# Patient Record
Sex: MALE | Race: WHITE | Employment: OTHER | URBAN - METROPOLITAN AREA
[De-identification: names, ages, dates, MRNs, and addresses within clinical notes are randomized per-mention and may not be internally consistent; named-entity substitution may affect disease eponyms.]

---

## 2017-08-02 ENCOUNTER — APPOINTMENT (OUTPATIENT)
Dept: CT IMAGING | Age: 52
End: 2017-08-02
Attending: EMERGENCY MEDICINE
Payer: COMMERCIAL

## 2017-08-02 ENCOUNTER — HOSPITAL ENCOUNTER (EMERGENCY)
Age: 52
Discharge: HOME | End: 2017-08-03
Attending: EMERGENCY MEDICINE | Admitting: EMERGENCY MEDICINE
Payer: COMMERCIAL

## 2017-08-02 DIAGNOSIS — K57.92 ACUTE DIVERTICULITIS: Primary | ICD-10-CM

## 2017-08-02 LAB
ANION GAP BLD CALC-SCNC: 9 MMOL/L (ref 5–15)
BASOPHILS # BLD AUTO: 0 K/UL (ref 0–0.1)
BASOPHILS # BLD: 0 % (ref 0–1)
BUN SERPL-MCNC: 13 MG/DL (ref 6–20)
BUN/CREAT SERPL: 11 (ref 12–20)
CALCIUM SERPL-MCNC: 9 MG/DL (ref 8.5–10.1)
CHLORIDE SERPL-SCNC: 103 MMOL/L (ref 97–108)
CO2 SERPL-SCNC: 29 MMOL/L (ref 21–32)
CREAT SERPL-MCNC: 1.22 MG/DL (ref 0.7–1.3)
EOSINOPHIL # BLD: 0.1 K/UL (ref 0–0.4)
EOSINOPHIL NFR BLD: 1 % (ref 0–7)
ERYTHROCYTE [DISTWIDTH] IN BLOOD BY AUTOMATED COUNT: 14.4 % (ref 11.5–14.5)
GLUCOSE SERPL-MCNC: 93 MG/DL (ref 65–100)
HCT VFR BLD AUTO: 38.2 % (ref 36.6–50.3)
HGB BLD-MCNC: 12.7 G/DL (ref 12.1–17)
LYMPHOCYTES # BLD AUTO: 30 % (ref 12–49)
LYMPHOCYTES # BLD: 3.2 K/UL (ref 0.8–3.5)
MCH RBC QN AUTO: 28.1 PG (ref 26–34)
MCHC RBC AUTO-ENTMCNC: 33.2 G/DL (ref 30–36.5)
MCV RBC AUTO: 84.5 FL (ref 80–99)
MONOCYTES # BLD: 0.9 K/UL (ref 0–1)
MONOCYTES NFR BLD AUTO: 9 % (ref 5–13)
NEUTS SEG # BLD: 6.4 K/UL (ref 1.8–8)
NEUTS SEG NFR BLD AUTO: 60 % (ref 32–75)
PLATELET # BLD AUTO: 184 K/UL (ref 150–400)
POTASSIUM SERPL-SCNC: 3.4 MMOL/L (ref 3.5–5.1)
RBC # BLD AUTO: 4.52 M/UL (ref 4.1–5.7)
SODIUM SERPL-SCNC: 141 MMOL/L (ref 136–145)
WBC # BLD AUTO: 10.5 K/UL (ref 4.1–11.1)

## 2017-08-02 PROCEDURE — 96375 TX/PRO/DX INJ NEW DRUG ADDON: CPT

## 2017-08-02 PROCEDURE — 74011250636 HC RX REV CODE- 250/636: Performed by: EMERGENCY MEDICINE

## 2017-08-02 PROCEDURE — 36415 COLL VENOUS BLD VENIPUNCTURE: CPT | Performed by: EMERGENCY MEDICINE

## 2017-08-02 PROCEDURE — 96365 THER/PROPH/DIAG IV INF INIT: CPT

## 2017-08-02 PROCEDURE — 74176 CT ABD & PELVIS W/O CONTRAST: CPT

## 2017-08-02 PROCEDURE — 96361 HYDRATE IV INFUSION ADD-ON: CPT

## 2017-08-02 PROCEDURE — 85025 COMPLETE CBC W/AUTO DIFF WBC: CPT | Performed by: EMERGENCY MEDICINE

## 2017-08-02 PROCEDURE — 80048 BASIC METABOLIC PNL TOTAL CA: CPT | Performed by: EMERGENCY MEDICINE

## 2017-08-02 PROCEDURE — 99283 EMERGENCY DEPT VISIT LOW MDM: CPT

## 2017-08-02 PROCEDURE — 96372 THER/PROPH/DIAG INJ SC/IM: CPT

## 2017-08-02 PROCEDURE — 74011250637 HC RX REV CODE- 250/637: Performed by: EMERGENCY MEDICINE

## 2017-08-02 PROCEDURE — 74011000258 HC RX REV CODE- 258: Performed by: EMERGENCY MEDICINE

## 2017-08-02 RX ORDER — KETOROLAC TROMETHAMINE 30 MG/ML
15 INJECTION, SOLUTION INTRAMUSCULAR; INTRAVENOUS
Status: COMPLETED | OUTPATIENT
Start: 2017-08-02 | End: 2017-08-02

## 2017-08-02 RX ORDER — ACETAMINOPHEN 325 MG/1
975 TABLET ORAL
Status: COMPLETED | OUTPATIENT
Start: 2017-08-02 | End: 2017-08-02

## 2017-08-02 RX ORDER — DICYCLOMINE HYDROCHLORIDE 10 MG/ML
20 INJECTION INTRAMUSCULAR
Status: COMPLETED | OUTPATIENT
Start: 2017-08-02 | End: 2017-08-02

## 2017-08-02 RX ORDER — METRONIDAZOLE 500 MG/1
500 TABLET ORAL 3 TIMES DAILY
Qty: 30 TAB | Refills: 0 | Status: SHIPPED | OUTPATIENT
Start: 2017-08-02 | End: 2017-08-12

## 2017-08-02 RX ORDER — CIPROFLOXACIN 750 MG/1
750 TABLET, FILM COATED ORAL 2 TIMES DAILY
Qty: 20 TAB | Refills: 0 | Status: SHIPPED | OUTPATIENT
Start: 2017-08-02

## 2017-08-02 RX ADMIN — KETOROLAC TROMETHAMINE 15 MG: 30 INJECTION, SOLUTION INTRAMUSCULAR at 23:16

## 2017-08-02 RX ADMIN — SODIUM CHLORIDE 1000 ML: 900 INJECTION, SOLUTION INTRAVENOUS at 23:31

## 2017-08-02 RX ADMIN — ACETAMINOPHEN 975 MG: 325 TABLET, FILM COATED ORAL at 23:16

## 2017-08-02 RX ADMIN — DICYCLOMINE HYDROCHLORIDE 20 MG: 20 INJECTION, SOLUTION INTRAMUSCULAR at 22:18

## 2017-08-02 RX ADMIN — SODIUM CHLORIDE 1000 ML: 900 INJECTION, SOLUTION INTRAVENOUS at 22:15

## 2017-08-02 RX ADMIN — PIPERACILLIN SODIUM,TAZOBACTAM SODIUM 3.38 G: 3; .375 INJECTION, POWDER, FOR SOLUTION INTRAVENOUS at 23:16

## 2017-08-03 VITALS
OXYGEN SATURATION: 100 % | HEIGHT: 69 IN | RESPIRATION RATE: 18 BRPM | DIASTOLIC BLOOD PRESSURE: 86 MMHG | HEART RATE: 86 BPM | WEIGHT: 195 LBS | BODY MASS INDEX: 28.88 KG/M2 | SYSTOLIC BLOOD PRESSURE: 122 MMHG | TEMPERATURE: 98.3 F

## 2017-08-03 LAB
APPEARANCE UR: CLEAR
BACTERIA URNS QL MICRO: NEGATIVE /HPF
BILIRUB UR QL: NEGATIVE
COLOR UR: ABNORMAL
EPITH CASTS URNS QL MICRO: ABNORMAL /LPF
GLUCOSE UR STRIP.AUTO-MCNC: NEGATIVE MG/DL
HGB UR QL STRIP: ABNORMAL
HYALINE CASTS URNS QL MICRO: ABNORMAL /LPF (ref 0–5)
KETONES UR QL STRIP.AUTO: NEGATIVE MG/DL
LEUKOCYTE ESTERASE UR QL STRIP.AUTO: NEGATIVE
NITRITE UR QL STRIP.AUTO: NEGATIVE
PH UR STRIP: 6 [PH] (ref 5–8)
PROT UR STRIP-MCNC: ABNORMAL MG/DL
RBC #/AREA URNS HPF: ABNORMAL /HPF (ref 0–5)
SP GR UR REFRACTOMETRY: 1.02 (ref 1–1.03)
UROBILINOGEN UR QL STRIP.AUTO: 0.2 EU/DL (ref 0.2–1)
WBC URNS QL MICRO: ABNORMAL /HPF (ref 0–4)

## 2017-08-03 PROCEDURE — 81001 URINALYSIS AUTO W/SCOPE: CPT | Performed by: EMERGENCY MEDICINE

## 2017-08-03 NOTE — ED PROVIDER NOTES
HPI Comments: 47 y.o. male with past medical history significant for diverticulitis, testicular cancer, and asthma who presents from home with chief complaint of abdominal pain. Patient complains of sudden onset of severe lower abdominal pain that radiates to his right flank and right lower back approximately 2 hours ago. Patient states that he was cooking dinner during onset of pain. He complains of difficulty urinating and decreased urinary output. He states that his urine \"feels thick\". Patient's last bowel movement was 1 hour ago. He reports that his current pain does not feel similar to the pain he experienced with his diverticulitis. He denies having any vomiting, diaphoresis, or dysuria. There are no other acute medical concerns at this time. Social hx: former smoker, occasional EtOH use, no drug use  PCP: Ortega Olivier MD  Review of Records: Patient was admitted in November 2013 (4 years ago) for sigmoid colon diverticulitis. He received a CT scan in October 2013 which was unremarkable for kidney stones. Patient has had testicular cancer twice (last occurrence 10 years ago) and received a bilateral orchiectomy. He states that his cancer was non metastatic and denies receiving chemotherapy. Note written by Nico Botello, as dictated by Eitan Duran, DO 9:40 PM    The history is provided by the patient. No  was used. Past Medical History:   Diagnosis Date    Asthma     Cancer [facility])     testicular    Cancer of testicle ([facility] [address]     Diverticulitis        Past Surgical History:   Procedure Laterality Date    ABDOMEN SURGERY PROC UNLISTED  1991    abd lymph nodes removed    HX HEENT      HX UROLOGICAL  2006    Bilat Orchiectomy    NEUROLOGICAL PROCEDURE UNLISTED      fatty tumor on spine         History reviewed. No pertinent family history.     Social History     Social History    Marital status:      Spouse name: N/A    Number of children: N/A    Years of education: N/A     Occupational History    Not on file. Social History Main Topics    Smoking status: Current Some Day Smoker     Packs/day: 0.00    Smokeless tobacco: Not on file      Comment: occasional cigarette    Alcohol use Yes      Comment: occasional    Drug use: Not on file    Sexual activity: Not on file     Other Topics Concern    Not on file     Social History Narrative         ALLERGIES: Contrast agent [iodine]    Review of Systems   Constitutional: Negative for diaphoresis. Gastrointestinal: Positive for abdominal pain. Negative for diarrhea, nausea and vomiting. Genitourinary: Positive for decreased urine volume, difficulty urinating and flank pain. Negative for dysuria. Musculoskeletal: Positive for back pain. All other systems reviewed and are negative. Vitals:    08/02/17 2115   BP: 132/80   Pulse: 93   Resp: 18   Temp: 98.8 °F (37.1 °C)   SpO2: 100%   Weight: 88.5 kg (195 lb)   Height: 5' 9\" (1.753 m)            Physical Exam      Constitutional: Pt is awake and alert. Pt appears well-developed and well-nourished. Appears to be in severe pain. HENT:   Head: Normocephalic and atraumatic. Nose: Nose normal.   Mouth/Throat: Oropharynx is clear and moist. No oropharyngeal exudate. Eyes: Conjunctivae and extraocular motions are normal. Pupils are equal, round, and reactive to light. Right eye exhibits no discharge. Left eye exhibits no discharge. No scleral icterus. Neck: No tracheal deviation present. Supple neck. Cardiovascular: Normal rate, regular rhythm, normal heart sounds and intact distal pulses. Exam reveals no gallop and no friction rub. No murmur heard. Pulmonary/Chest: Effort normal and breath sounds normal.  Pt  has no wheezes. Pt  has no rales. Abdominal: Soft. Pt  exhibits no distension and no mass. Tenderness of LLQ and suprapubic region. Pt has no rebound and no guarding.    Musculoskeletal:  Pt  exhibits no edema and no tenderness. Ext: Normal ROM in all four extremities; not tender to palpation; distal pulses are normal, no edema. Neurological:  Pt is alert. nonfocal neuro exam.  Skin: Skin is warm and dry. Pt  is not diaphoretic. Psychiatric:  Pt  has a normal mood and affect. Behavior is normal.   Note written by Nico Stark, as dictated by Domi Drake DO 9:40 PM  [facility]  ED Course       Procedures  PROGRESS NOTE:  10:37 PM Reviewed CT results. Reviewed ct images    11:00 pm - better. Still quite tender but soft. Will try toradol and tylenol. Iv zosyn ordered. Will need another abd exam before he leaves. Signed out to Dr Nela Fajardo for this. Recent Results (from the past 12 hour(s))   CBC WITH AUTOMATED DIFF    Collection Time: 08/02/17  9:56 PM   Result Value Ref Range    WBC 10.5 4.1 - 11.1 K/uL    RBC 4.52 4.10 - 5.70 M/uL    HGB 12.7 12.1 - 17.0 g/dL    HCT 38.2 36.6 - 50.3 %    MCV 84.5 80.0 - 99.0 FL    MCH 28.1 26.0 - 34.0 PG    MCHC 33.2 30.0 - 36.5 g/dL    RDW 14.4 11.5 - 14.5 %    PLATELET 845 740 - 744 K/uL    NEUTROPHILS 60 32 - 75 %    LYMPHOCYTES 30 12 - 49 %    MONOCYTES 9 5 - 13 %    EOSINOPHILS 1 0 - 7 %    BASOPHILS 0 0 - 1 %    ABS. NEUTROPHILS 6.4 1.8 - 8.0 K/UL    ABS. LYMPHOCYTES 3.2 0.8 - 3.5 K/UL    ABS. MONOCYTES 0.9 0.0 - 1.0 K/UL    ABS. EOSINOPHILS 0.1 0.0 - 0.4 K/UL    ABS.  BASOPHILS 0.0 0.0 - 0.1 K/UL   METABOLIC PANEL, BASIC    Collection Time: 08/02/17  9:56 PM   Result Value Ref Range    Sodium 141 136 - 145 mmol/L    Potassium 3.4 (L) 3.5 - 5.1 mmol/L    Chloride 103 97 - 108 mmol/L    CO2 29 21 - 32 mmol/L    Anion gap 9 5 - 15 mmol/L    Glucose 93 65 - 100 mg/dL    BUN 13 6 - 20 MG/DL    Creatinine 1.22 0.70 - 1.30 MG/DL    BUN/Creatinine ratio 11 (L) 12 - 20      GFR est AA >60 >60 ml/min/1.73m2    GFR est non-AA >60 >60 ml/min/1.73m2    Calcium 9.0 8.5 - 10.1 MG/DL             Pt, if he goes home, will be given cipro and flagyl  He does not want any rx for pain though. Wants to try heat and otc pain meds.

## 2017-08-03 NOTE — DISCHARGE INSTRUCTIONS
We hope that we have addressed all of your medical concerns. The examination and treatment you received in the Emergency Department were for an emergent problem and were not intended as complete care. It is important that you follow up with your healthcare provider(s) for ongoing care. If your symptoms worsen or do not improve as expected, and you are unable to reach your usual health care provider(s), you should return to the Emergency Department. Today's healthcare is undergoing tremendous change, and patient satisfaction surveys are one of the many tools to assess the quality of medical care. You may receive a survey from the [facility] regarding your experience in the Emergency Department. I hope that your experience has been completely positive, particularly the medical care that I provided. As such, please participate in the survey; anything less than excellent does not meet my expectations or intentions. formerly [facility] and [address] [place] [address] participate in nationally recognized quality of care measures. If your blood pressure is greater than 120/80, as reported below, we urge that you seek medical care to address the potential of high blood pressure, commonly known as hypertension. Hypertension can be hereditary or can be caused by certain medical conditions, pain, stress, or \"white coat syndrome. \"       Please make an appointment with your health care provider(s) for follow up of your Emergency Department visit. VITALS:   Patient Vitals for the past 8 hrs:   Temp Pulse Resp BP SpO2   08/02/17 2115 98.8 °F (37.1 °C) 93 18 132/80 100 %          Thank you for allowing us to provide you with medical care today. We realize that you have many choices for your emergency care needs. Please choose us in the future for any continued health care needs.       Regards,           Adriana Funes, DO    [facility]   Office: [phone]            Recent Results (from the past 24 hour(s))   CBC WITH AUTOMATED DIFF    Collection Time: 08/02/17  9:56 PM   Result Value Ref Range    WBC 10.5 4.1 - 11.1 K/uL    RBC 4.52 4.10 - 5.70 M/uL    HGB 12.7 12.1 - 17.0 g/dL    HCT 38.2 36.6 - 50.3 %    MCV 84.5 80.0 - 99.0 FL    MCH 28.1 26.0 - 34.0 PG    MCHC 33.2 30.0 - 36.5 g/dL    RDW 14.4 11.5 - 14.5 %    PLATELET 803 627 - 594 K/uL    NEUTROPHILS 60 32 - 75 %    LYMPHOCYTES 30 12 - 49 %    MONOCYTES 9 5 - 13 %    EOSINOPHILS 1 0 - 7 %    BASOPHILS 0 0 - 1 %    ABS. NEUTROPHILS 6.4 1.8 - 8.0 K/UL    ABS. LYMPHOCYTES 3.2 0.8 - 3.5 K/UL    ABS. MONOCYTES 0.9 0.0 - 1.0 K/UL    ABS. EOSINOPHILS 0.1 0.0 - 0.4 K/UL    ABS. BASOPHILS 0.0 0.0 - 0.1 K/UL   METABOLIC PANEL, BASIC    Collection Time: 08/02/17  9:56 PM   Result Value Ref Range    Sodium 141 136 - 145 mmol/L    Potassium 3.4 (L) 3.5 - 5.1 mmol/L    Chloride 103 97 - 108 mmol/L    CO2 29 21 - 32 mmol/L    Anion gap 9 5 - 15 mmol/L    Glucose 93 65 - 100 mg/dL    BUN 13 6 - 20 MG/DL    Creatinine 1.22 0.70 - 1.30 MG/DL    BUN/Creatinine ratio 11 (L) 12 - 20      GFR est AA >60 >60 ml/min/1.73m2    GFR est non-AA >60 >60 ml/min/1.73m2    Calcium 9.0 8.5 - 10.1 MG/DL       Ct Abd Pelv Wo Cont    Result Date: 8/2/2017  INDICATION: lower abd pain. acute onset 2 hrs ago. hx of diverticulitis. COMPARISON: October 11, 2013 TECHNIQUE: Noncontrast thin axial images were obtained through the abdomen and pelvis. Coronal and sagittal reconstructions were generated. CT dose reduction was achieved through use of a standardized protocol tailored for this examination and automatic exposure control for dose modulation. Adaptive statistical iterative reconstruction (ASIR) was utilized. FINDINGS: LUNG BASES: No abnormality. LIVER: No mass or biliary dilatation. GALLBLADDER: Unremarkable. SPLEEN: No enlargement or lesion. PANCREAS: No mass or ductal dilatation. ADRENALS: No mass.  KIDNEYS: No mass, calculus, or hydronephrosis. GI TRACT:  Focal inflammatory stranding and concentric wall thickening of the sigmoid colon, associated with diverticulosis. PERITONEUM: No free air or free fluid. APPENDIX: Unremarkable. [place] Ring RETROPERITONEUM: Surgical clips in the left retroperitoneum. No enlarged abdominal lymph nodes. ADDITIONAL COMMENTS: N/A. URINARY BLADDER: Unremarkable. REPRODUCTIVE ORGANS: Unremarkable. LYMPH NODES:  None enlarged. FREE FLUID:  Small amount of free fluid. BONES: No destructive bone lesion. ADDITIONAL COMMENTS: N/A. IMPRESSION: Focal concentric sigmoid wall thickening, associated with diverticulosis and inflammatory stranding. This is most consistent with acute diverticulitis, although focal colitis or less likely neoplasm may present in this fashion. Consider clinical follow-up to resolution and if necessary, follow-up endoscopy after acute inflammation has resolved. Learning About Diverticulosis and Diverticulitis  What are diverticulosis and diverticulitis? In diverticulosis and diverticulitis, pouches called diverticula form in the wall of the large intestine, or colon. · In diverticulosis, the pouches do not cause any pain or other symptoms. · In diverticulitis, the pouches get inflamed or infected and cause symptoms. Doctors aren't sure what causes these pouches in the colon. But they think that a low-fiber diet may play a role. Without fiber to add bulk to the stool, the colon has to work harder than normal to push the stool forward. The pressure from this may cause pouches to form in weak spots along the colon. Some people with diverticulosis get diverticulitis. But experts don't know why this happens. What are the symptoms? · In diverticulosis, most people don't have symptoms. But pouches sometimes bleed. · In diverticulitis, symptoms may last from a few hours to a week or more. They include:  ¨ Belly pain. This is usually in the lower left side. It is sometimes worse when you move. This is the most common symptom. ¨ Fever and chills. ¨ Bloating and gas. ¨ Diarrhea or constipation. ¨ Nausea and sometimes vomiting. ¨ Not feeling like eating. How can you prevent these problems? You may be able to lower your chance of getting diverticulitis. You can do this by taking steps to prevent constipation. · Eat fruits, vegetables, beans, and whole grains every day. These foods are high in fiber. · Drink plenty of fluids (enough so that your urine is light yellow or clear like water). If you have kidney, heart, or liver disease and have to limit fluids, talk with your doctor before you increase the amount of fluids you drink. · Get at least 30 minutes of exercise on most days of the week. Walking is a good choice. You also may want to do other activities, such as running, swimming, cycling, or playing tennis or team sports. · Take a fiber supplement, such as Citrucel or Metamucil, every day if needed. Read and follow all instructions on the label. · Schedule time each day for a bowel movement. Having a daily routine may help. Take your time and do not strain when having a bowel movement. Some people avoid nuts, seeds, berries, and popcorn. They believe that these foods might get trapped in the diverticula and cause pain. But there is no proof that these foods cause diverticulitis or make it worse. How are these problems treated? · The best way to treat diverticulosis is to avoid constipation. (See the tips above.)  · Treatment for diverticulitis includes antibiotics and often a change in your diet. You may need only liquids at first. Your doctor may suggest pain medicines for pain or belly cramps. In some cases, surgery may be needed. Follow-up care is a key part of your treatment and safety. Be sure to make and go to all appointments, and call your doctor if you are having problems. It's also a good idea to know your test results and keep a list of the medicines you take.   Where can you learn more? Go to http://susanna-michael.info/. Enter X043 in the search box to learn more about \"Learning About Diverticulosis and Diverticulitis. \"  Current as of: August 9, 2016  Content Version: 11.3  © 6389-8854 [email or web address]. Care instructions adapted under license by [employer] (which disclaims liability or warranty for this information). If you have questions about a medical condition or this instruction, always ask your healthcare professional. [place] [address] any warranty or liability for your use of this information.

## 2017-08-03 NOTE — ED NOTES
11:30 PM  Change of shift. Care of patient taken over from Dr. Mony Navarro by Dr. Xavier Juarez; H&P reviewed, handoff complete. Pending re-evaluation for abdominal pain/diverticulitis. Pt in no acute distress. Current pain severity: 3/10. Will re-evaluate in 30 minutes. PROGRESS NOTE:  12:33 AM  Pain resolved. Abdomen soft. Advised to f/u with PCP in two days. Pt is agreeable with plan for discharge and f/u.

## 2017-08-03 NOTE — ED TRIAGE NOTES
Pt arrives with lower abd pain that \"shoots across\" x several hours. Reports having urinary frequency. + nausea, no vomiting. Afebrile.

## 2024-02-07 ENCOUNTER — HOSPITAL ENCOUNTER (EMERGENCY)
Facility: HOSPITAL | Age: 59
Discharge: HOME | End: 2024-02-08
Attending: EMERGENCY MEDICINE
Payer: COMMERCIAL

## 2024-02-07 ENCOUNTER — APPOINTMENT (OUTPATIENT)
Facility: HOSPITAL | Age: 59
End: 2024-02-07
Payer: COMMERCIAL

## 2024-02-07 DIAGNOSIS — R07.9 CHEST PAIN, UNSPECIFIED TYPE: Primary | ICD-10-CM

## 2024-02-07 LAB
ALBUMIN SERPL-MCNC: 3.9 G/DL (ref 3.5–5)
ALBUMIN/GLOB SERPL: 1.3 (ref 1.1–2.2)
ALP SERPL-CCNC: 49 U/L (ref 45–117)
ALT SERPL-CCNC: 24 U/L (ref 12–78)
ANION GAP SERPL CALC-SCNC: 10 MMOL/L (ref 5–15)
AST SERPL-CCNC: 26 U/L (ref 15–37)
BASOPHILS # BLD: 0.1 K/UL (ref 0–0.1)
BASOPHILS NFR BLD: 1 % (ref 0–1)
BILIRUB SERPL-MCNC: 0.7 MG/DL (ref 0.2–1)
BUN SERPL-MCNC: 21 MG/DL (ref 6–20)
BUN/CREAT SERPL: 20 (ref 12–20)
CALCIUM SERPL-MCNC: 9.7 MG/DL (ref 8.5–10.1)
CHLORIDE SERPL-SCNC: 104 MMOL/L (ref 97–108)
CO2 SERPL-SCNC: 26 MMOL/L (ref 21–32)
COMMENT:: NORMAL
CREAT SERPL-MCNC: 1.04 MG/DL (ref 0.7–1.3)
DIFFERENTIAL METHOD BLD: ABNORMAL
EOSINOPHIL # BLD: 0.3 K/UL (ref 0–0.4)
EOSINOPHIL NFR BLD: 3 % (ref 0–7)
ERYTHROCYTE [DISTWIDTH] IN BLOOD BY AUTOMATED COUNT: 13.8 % (ref 11.5–14.5)
GLOBULIN SER CALC-MCNC: 2.9 G/DL (ref 2–4)
GLUCOSE SERPL-MCNC: 106 MG/DL (ref 65–100)
HCT VFR BLD AUTO: 38.7 % (ref 36.6–50.3)
HGB BLD-MCNC: 13 G/DL (ref 12.1–17)
IMM GRANULOCYTES # BLD AUTO: 0.1 K/UL (ref 0–0.04)
IMM GRANULOCYTES NFR BLD AUTO: 1 % (ref 0–0.5)
LYMPHOCYTES # BLD: 3.9 K/UL (ref 0.8–3.5)
LYMPHOCYTES NFR BLD: 47 % (ref 12–49)
MCH RBC QN AUTO: 27.5 PG (ref 26–34)
MCHC RBC AUTO-ENTMCNC: 33.6 G/DL (ref 30–36.5)
MCV RBC AUTO: 81.8 FL (ref 80–99)
MONOCYTES # BLD: 0.6 K/UL (ref 0–1)
MONOCYTES NFR BLD: 8 % (ref 5–13)
NEUTS SEG # BLD: 3.3 K/UL (ref 1.8–8)
NEUTS SEG NFR BLD: 40 % (ref 32–75)
NRBC # BLD: 0 K/UL (ref 0–0.01)
NRBC BLD-RTO: 0 PER 100 WBC
NT PRO BNP: 29 PG/ML
PLATELET # BLD AUTO: 249 K/UL (ref 150–400)
PMV BLD AUTO: 9.7 FL (ref 8.9–12.9)
POTASSIUM SERPL-SCNC: 3.7 MMOL/L (ref 3.5–5.1)
PROT SERPL-MCNC: 6.8 G/DL (ref 6.4–8.2)
RBC # BLD AUTO: 4.73 M/UL (ref 4.1–5.7)
SODIUM SERPL-SCNC: 140 MMOL/L (ref 136–145)
SPECIMEN HOLD: NORMAL
TROPONIN I SERPL HS-MCNC: 5 NG/L (ref 0–76)
WBC # BLD AUTO: 8.2 K/UL (ref 4.1–11.1)

## 2024-02-07 PROCEDURE — 71046 X-RAY EXAM CHEST 2 VIEWS: CPT

## 2024-02-07 PROCEDURE — 80053 COMPREHEN METABOLIC PANEL: CPT

## 2024-02-07 PROCEDURE — 84484 ASSAY OF TROPONIN QUANT: CPT

## 2024-02-07 PROCEDURE — 93005 ELECTROCARDIOGRAM TRACING: CPT | Performed by: STUDENT IN AN ORGANIZED HEALTH CARE EDUCATION/TRAINING PROGRAM

## 2024-02-07 PROCEDURE — 83880 ASSAY OF NATRIURETIC PEPTIDE: CPT

## 2024-02-07 PROCEDURE — 36415 COLL VENOUS BLD VENIPUNCTURE: CPT

## 2024-02-07 PROCEDURE — 99285 EMERGENCY DEPT VISIT HI MDM: CPT

## 2024-02-07 PROCEDURE — 85025 COMPLETE CBC W/AUTO DIFF WBC: CPT

## 2024-02-07 RX ORDER — EZETIMIBE 10 MG/1
10 TABLET ORAL DAILY
COMMUNITY

## 2024-02-07 RX ORDER — ASPIRIN 81 MG/1
81 TABLET, CHEWABLE ORAL DAILY
COMMUNITY

## 2024-02-07 ASSESSMENT — PAIN DESCRIPTION - ORIENTATION: ORIENTATION: MID

## 2024-02-07 ASSESSMENT — PAIN SCALES - GENERAL: PAINLEVEL_OUTOF10: 6

## 2024-02-07 ASSESSMENT — PAIN - FUNCTIONAL ASSESSMENT: PAIN_FUNCTIONAL_ASSESSMENT: 0-10

## 2024-02-07 ASSESSMENT — PAIN DESCRIPTION - DESCRIPTORS: DESCRIPTORS: CRUSHING;DISCOMFORT

## 2024-02-07 ASSESSMENT — PAIN DESCRIPTION - LOCATION: LOCATION: CHEST

## 2024-02-08 VITALS
OXYGEN SATURATION: 96 % | RESPIRATION RATE: 13 BRPM | HEART RATE: 83 BPM | TEMPERATURE: 97.9 F | BODY MASS INDEX: 24.89 KG/M2 | DIASTOLIC BLOOD PRESSURE: 78 MMHG | WEIGHT: 164.2 LBS | SYSTOLIC BLOOD PRESSURE: 114 MMHG | HEIGHT: 68 IN

## 2024-02-08 LAB — TROPONIN I SERPL HS-MCNC: 5 NG/L (ref 0–76)

## 2024-02-08 PROCEDURE — 84484 ASSAY OF TROPONIN QUANT: CPT

## 2024-02-08 PROCEDURE — 36415 COLL VENOUS BLD VENIPUNCTURE: CPT

## 2024-02-08 NOTE — ED TRIAGE NOTES
Pt. Presents with concerns for asthma exacerbation, states she started to cynthia very short of breath and took a nebulizer treatment that is prescribed to his son. Pt. In triage and states he feels as if he has a severe heaviness and crushing sensation in his chest. EKG was obtained immediately and provided to Dr. Bobo. Pt. Appears to be uncomfortable, states he feels \"off.\" Pt. Denies significant medical history.

## 2024-02-08 NOTE — DISCHARGE INSTRUCTIONS
Return immediately with any recurrent symptoms or new symptoms you find concerning.  In the meantime please follow-up with your primary care doctor and/or cardiology

## 2024-02-08 NOTE — ED NOTES
Assumed care of patient from triage. VSS, NAD noted at this time. Patient comfortable, denies needs at this time. Call bell within reach.

## 2024-02-08 NOTE — ED NOTES
Patient A/O at baseline, VSS, PIV removed, ambulatory out of department.   Reviewed discharge paperwork and instructions with patient. All questions were answered appropriately. Pt verbalized understanding.

## 2024-02-08 NOTE — ED PROVIDER NOTES
[facility] EMERGENCY DEP  EMERGENCY DEPARTMENT ENCOUNTER      Pt Name: Jaden Wick  MRN: [id number]  Birthdate 1965  Date of evaluation: 2/7/2024  Provider: Rehan Bobo MD      HISTORY OF PRESENT ILLNESS      \[facility]\""  58-year-old man with a past medical history of hypertension and hyperlipidemia presenting to the emergency department due to chest pain.  Patient says around 10 PM he started to develop shortness of breath.  He says it felt like he was having an asthma exacerbation which has happened to him in the past.  He used a nebulized albuterol treatment one of his sons had at home.  He says this did help his shortness of breath but afterwards he developed left-sided chest pain that he describes as a pressure.  Says it is nonradiating.  The shortness of breath has not returned, but he continues to have some chest pressure.  Denies exacerbating or alleviating factors.  Has not noticed any dyspnea or chest pain on exertion.  No recent URI symptoms.  No fevers.  Non-smoker.  No known family history of coronary artery disease per the patient.      Nursing Notes were reviewed.    REVIEW OF SYSTEMS         Review of Systems  A complete review of systems was performed and all systems reviewed are negative less otherwise document in the HPI      PAST MEDICAL HISTORY   History reviewed. No pertinent past medical history.      SURGICAL HISTORY     History reviewed. No pertinent surgical history.      CURRENT MEDICATIONS       Previous Medications    ASPIRIN 81 MG CHEWABLE TABLET    Take 1 tablet by mouth daily    EZETIMIBE (ZETIA) 10 MG TABLET    Take 1 tablet by mouth daily       ALLERGIES     Iodinated contrast media    FAMILY HISTORY     History reviewed. No pertinent family history.       SOCIAL HISTORY       Social History     Socioeconomic History    Marital status:      Spouse name: None    Number of children: None    Years of education: None    Highest education level: None         PHYSICAL EXAM       ED

## 2024-02-09 LAB
EKG ATRIAL RATE: 88 BPM
EKG DIAGNOSIS: NORMAL
EKG P AXIS: 63 DEGREES
EKG P-R INTERVAL: 206 MS
EKG Q-T INTERVAL: 366 MS
EKG QRS DURATION: 108 MS
EKG QTC CALCULATION (BAZETT): 442 MS
EKG R AXIS: 45 DEGREES
EKG T AXIS: -2 DEGREES
EKG VENTRICULAR RATE: 88 BPM

## 2024-02-09 PROCEDURE — 93010 ELECTROCARDIOGRAM REPORT: CPT | Performed by: SPECIALIST
